# Patient Record
Sex: FEMALE | Race: ASIAN | NOT HISPANIC OR LATINO | Employment: OTHER | URBAN - METROPOLITAN AREA
[De-identification: names, ages, dates, MRNs, and addresses within clinical notes are randomized per-mention and may not be internally consistent; named-entity substitution may affect disease eponyms.]

---

## 2023-05-31 ENCOUNTER — TELEPHONE (OUTPATIENT)
Dept: PEDIATRICS | Facility: OTHER | Age: 73
End: 2023-05-31

## 2023-05-31 NOTE — TELEPHONE ENCOUNTER
Caller: YULIYA SPENCER    Relationship to patient: Emergency Contact    Best call back number: [phone]    Patient is needing: PATIENT'S RELATIVE CALLED AND SCHEDULED HER A NEW PATIENT APPOINTMENT WITH REESE PERALTA FOR July    THE PATIENT'S RELATIVE SAID THAT SHE HAS BEEN EXPERIENCING SOME DISCOMFORT IN HER CHEST AND WANTS TO BE SEEN FOR THAT    HUB ADVISED IF SHE IS EXPERIENCING CHEST PAIN SHE WILL NEED TO GO TO THE EMERGENCY ROOM, HE SAID HE DID NOT THINK IT WAS THAT BAD RIGHT NOW AND THAT IF IT BECAME WORSE HE WOULD TAKE HER TO THE EMERGENCY ROOM

## 2023-07-14 PROBLEM — Z12.11 SCREENING FOR MALIGNANT NEOPLASM OF COLON: Status: ACTIVE | Noted: 2023-07-14

## 2023-07-14 PROBLEM — Z00.00 PREVENTATIVE HEALTH CARE: Status: ACTIVE | Noted: 2023-07-14

## 2023-07-14 PROBLEM — Z13.820 SCREENING FOR OSTEOPOROSIS: Status: ACTIVE | Noted: 2023-07-14

## 2023-07-14 PROBLEM — E55.9 VITAMIN D DEFICIENCY: Status: ACTIVE | Noted: 2023-07-14

## 2023-07-14 PROBLEM — Z78.0 ASYMPTOMATIC MENOPAUSE: Status: ACTIVE | Noted: 2023-07-14

## 2023-07-14 PROBLEM — R07.9 CHEST PAIN: Status: ACTIVE | Noted: 2023-07-14

## 2023-07-14 PROBLEM — Z12.31 SCREENING MAMMOGRAM FOR BREAST CANCER: Status: ACTIVE | Noted: 2023-07-14

## 2025-01-27 ENCOUNTER — LAB (OUTPATIENT)
Dept: FAMILY MEDICINE CLINIC | Facility: CLINIC | Age: 75
End: 2025-01-27

## 2025-01-27 ENCOUNTER — OFFICE VISIT (OUTPATIENT)
Dept: FAMILY MEDICINE CLINIC | Facility: CLINIC | Age: 75
End: 2025-01-27

## 2025-01-27 VITALS
BODY MASS INDEX: 24.14 KG/M2 | SYSTOLIC BLOOD PRESSURE: 122 MMHG | HEIGHT: 58 IN | DIASTOLIC BLOOD PRESSURE: 80 MMHG | HEART RATE: 81 BPM | WEIGHT: 115 LBS | OXYGEN SATURATION: 98 %

## 2025-01-27 DIAGNOSIS — Z00.00 PREVENTATIVE HEALTH CARE: ICD-10-CM

## 2025-01-27 DIAGNOSIS — G89.29 CHRONIC PAIN OF RIGHT KNEE: Primary | ICD-10-CM

## 2025-01-27 DIAGNOSIS — E55.9 VITAMIN D DEFICIENCY: ICD-10-CM

## 2025-01-27 DIAGNOSIS — M25.561 CHRONIC PAIN OF RIGHT KNEE: Primary | ICD-10-CM

## 2025-01-27 PROCEDURE — 82306 VITAMIN D 25 HYDROXY: CPT | Performed by: NURSE PRACTITIONER

## 2025-01-27 PROCEDURE — 85027 COMPLETE CBC AUTOMATED: CPT | Performed by: NURSE PRACTITIONER

## 2025-01-27 PROCEDURE — 84443 ASSAY THYROID STIM HORMONE: CPT | Performed by: NURSE PRACTITIONER

## 2025-01-27 PROCEDURE — 80061 LIPID PANEL: CPT | Performed by: NURSE PRACTITIONER

## 2025-01-27 PROCEDURE — 36415 COLL VENOUS BLD VENIPUNCTURE: CPT | Performed by: NURSE PRACTITIONER

## 2025-01-27 PROCEDURE — 99214 OFFICE O/P EST MOD 30 MIN: CPT | Performed by: NURSE PRACTITIONER

## 2025-01-27 PROCEDURE — 80053 COMPREHEN METABOLIC PANEL: CPT | Performed by: NURSE PRACTITIONER

## 2025-01-27 PROCEDURE — 83036 HEMOGLOBIN GLYCOSYLATED A1C: CPT | Performed by: NURSE PRACTITIONER

## 2025-01-27 RX ORDER — MELOXICAM 7.5 MG/1
7.5 TABLET ORAL DAILY
Qty: 30 TABLET | Refills: 1 | Status: SHIPPED | OUTPATIENT
Start: 2025-01-27

## 2025-01-27 NOTE — PROGRESS NOTES
"Chief Complaint  Follow-up (Check up ) and Knee Pain (Right knee pain for the last few months/seems to be worse over the last month. )    Ester Romero presents to [facility]  History of Present Illness    Patient presents for follow-up visit.  She reports just needing a \"checkup.\"  Patient last seen in July 2023.  Past medical history includes hyperlipidemia and prediabetes.  Patient is not taking any medications currently. Patient is c/o right knee pain, ongoing for a few months. Son -in - law with her at visit today, denies any injury. Patient reports a clicking noise when ambulating. Patient denies dizziness, headache, chest pain, edema, cough or dyspnea. There are no GI or  complaints.     PHQ-9 Depression Screening  Little interest or pleasure in doing things? Not at all   Feeling down, depressed, or hopeless? Not at all   PHQ-2 Total Score 0   Trouble falling or staying asleep, or sleeping too much?     Feeling tired or having little energy?     Poor appetite or overeating?     Feeling bad about yourself - or that you are a failure or have let yourself or your family down?     Trouble concentrating on things, such as reading the newspaper or watching television?     Moving or speaking so slowly that other people could have noticed? Or the opposite - being so fidgety or restless that you have been moving around a lot more than usual?     Thoughts that you would be better off dead, or of hurting yourself in some way?     PHQ-9 Total Score     If you checked off any problems, how difficult have these problems made it for you to do your work, take care of things at home, or get along with other people? Not difficult at all      Objective   Vital Signs:  /80 (BP Location: Left arm, Patient Position: Sitting, Cuff Size: Adult)   Pulse 81   Ht 147.3 cm (58\")   Wt 52.2 kg (115 lb)   SpO2 98%   BMI 24.04 kg/m²   Estimated body mass index is 24.04 kg/m² as " "calculated from the following:    Height as of this encounter: 147.3 cm (58\").    Weight as of this encounter: 52.2 kg (115 lb).    BMI is within normal parameters. No other follow-up for BMI required.      Physical Exam  Constitutional:       Appearance: Normal appearance.   HENT:      Head: Normocephalic.   Cardiovascular:      Rate and Rhythm: Normal rate and regular rhythm.   Pulmonary:      Effort: Pulmonary effort is normal.      Breath sounds: Normal breath sounds.   Abdominal:      General: Abdomen is flat. Bowel sounds are normal.      Palpations: Abdomen is soft.   Musculoskeletal:         General: Normal range of motion.      Cervical back: Neck supple.      Right lower leg: No edema.      Left lower leg: No edema.   Skin:     General: Skin is warm and dry.   Neurological:      Mental Status: She is alert and oriented to person, place, and time.      Gait: Gait is intact.   Psychiatric:         Attention and Perception: Attention normal.         Mood and Affect: Mood normal.         Speech: Speech normal.        Result Review :                Assessment and Plan   Diagnoses and all orders for this visit:    1. Chronic pain of right knee (Primary)  -     meloxicam (Mobic) 7.5 MG tablet; Take 1 tablet by mouth Daily.  Dispense: 30 tablet; Refill: 1  -     XR Knee 3 View Right; Future    2. Preventative health care  Assessment & Plan:  Mammogram, Colon CA screening, DEXA deferred per patient  Labs today    Orders:  -     CBC (No Diff)  -     Comprehensive Metabolic Panel  -     Hemoglobin A1c  -     Lipid Panel  -     TSH    3. Vitamin D deficiency  -     Vitamin D,25-Hydroxy           I spent 30 minutes caring for Hadley on this date of service. This time includes time spent by me in the following activities:preparing for the visit, reviewing tests, obtaining and/or reviewing a separately obtained history, performing a medically appropriate examination and/or evaluation , counseling and educating the " patient/family/caregiver, ordering medications, tests, or procedures, documenting information in the medical record, and care coordination  Follow Up   Return in about 1 year (around 1/27/2026) for Annual physical.  Patient was given instructions and counseling regarding her condition or for health maintenance advice. Please see specific information pulled into the AVS if appropriate.

## 2025-01-28 LAB
25(OH)D3 SERPL-MCNC: 22 NG/ML (ref 30–100)
ALBUMIN SERPL-MCNC: 4.2 G/DL (ref 3.5–5.2)
ALBUMIN/GLOB SERPL: 1.2 G/DL
ALP SERPL-CCNC: 79 U/L (ref 39–117)
ALT SERPL W P-5'-P-CCNC: 15 U/L (ref 1–33)
ANION GAP SERPL CALCULATED.3IONS-SCNC: 10.8 MMOL/L (ref 5–15)
AST SERPL-CCNC: 12 U/L (ref 1–32)
BILIRUB SERPL-MCNC: 0.4 MG/DL (ref 0–1.2)
BUN SERPL-MCNC: 12 MG/DL (ref 8–23)
BUN/CREAT SERPL: 19.4 (ref 7–25)
CALCIUM SPEC-SCNC: 9.8 MG/DL (ref 8.6–10.5)
CHLORIDE SERPL-SCNC: 100 MMOL/L (ref 98–107)
CHOLEST SERPL-MCNC: 201 MG/DL (ref 0–200)
CO2 SERPL-SCNC: 29.2 MMOL/L (ref 22–29)
CREAT SERPL-MCNC: 0.62 MG/DL (ref 0.57–1)
DEPRECATED RDW RBC AUTO: 41.1 FL (ref 37–54)
EGFRCR SERPLBLD CKD-EPI 2021: 94.8 ML/MIN/1.73
ERYTHROCYTE [DISTWIDTH] IN BLOOD BY AUTOMATED COUNT: 12.9 % (ref 12.3–15.4)
GLOBULIN UR ELPH-MCNC: 3.5 GM/DL
GLUCOSE SERPL-MCNC: 135 MG/DL (ref 65–99)
HBA1C MFR BLD: 6.3 % (ref 4.8–5.6)
HCT VFR BLD AUTO: 40.6 % (ref 34–46.6)
HDLC SERPL-MCNC: 36 MG/DL (ref 40–60)
HGB BLD-MCNC: 13.6 G/DL (ref 12–15.9)
LDLC SERPL CALC-MCNC: 128 MG/DL (ref 0–100)
LDLC/HDLC SERPL: 3.42 {RATIO}
MCH RBC QN AUTO: 29.6 PG (ref 26.6–33)
MCHC RBC AUTO-ENTMCNC: 33.5 G/DL (ref 31.5–35.7)
MCV RBC AUTO: 88.3 FL (ref 79–97)
PLATELET # BLD AUTO: 376 10*3/MM3 (ref 140–450)
PMV BLD AUTO: 9.3 FL (ref 6–12)
POTASSIUM SERPL-SCNC: 4.4 MMOL/L (ref 3.5–5.2)
PROT SERPL-MCNC: 7.7 G/DL (ref 6–8.5)
RBC # BLD AUTO: 4.6 10*6/MM3 (ref 3.77–5.28)
SODIUM SERPL-SCNC: 140 MMOL/L (ref 136–145)
TRIGL SERPL-MCNC: 210 MG/DL (ref 0–150)
TSH SERPL DL<=0.05 MIU/L-ACNC: 0.77 UIU/ML (ref 0.27–4.2)
VLDLC SERPL-MCNC: 37 MG/DL (ref 5–40)
WBC NRBC COR # BLD AUTO: 6.62 10*3/MM3 (ref 3.4–10.8)

## 2025-01-28 RX ORDER — ATORVASTATIN CALCIUM 10 MG/1
10 TABLET, FILM COATED ORAL DAILY
Qty: 90 TABLET | Refills: 0 | Status: SHIPPED | OUTPATIENT
Start: 2025-01-28

## 2025-01-28 NOTE — PROGRESS NOTES
Please let patient know that her triglycerides have improved somewhat over the last year but her LDL is increasing.  My recommendation would be to start medication to help lower cholesterol and cardiovascular risk.  Vitamin D level remains very low.  Patient should be taking at least 1000 units of vitamin D3.  Hemoglobin A1c remains consistent with prediabetes.  Limiting concentrated sweets and carbohydrates would be recommended.    The 10-year ASCVD risk score (Celio YOUNG, et al., 2019) is: 11.2%    Values used to calculate the score:      Age: 74 years      Sex: Female      Is Non- : No      Diabetic: No      Tobacco smoker: No      Systolic Blood Pressure: 122 mmHg      Is BP treated: No      HDL Cholesterol: 36 mg/dL      Total Cholesterol: 201 mg/dL

## 2025-02-03 ENCOUNTER — HOSPITAL ENCOUNTER (OUTPATIENT)
Dept: GENERAL RADIOLOGY | Facility: HOSPITAL | Age: 75
Discharge: HOME | End: 2025-02-03
Admitting: NURSE PRACTITIONER

## 2025-02-03 DIAGNOSIS — G89.29 CHRONIC PAIN OF RIGHT KNEE: ICD-10-CM

## 2025-02-03 DIAGNOSIS — M25.561 CHRONIC PAIN OF RIGHT KNEE: ICD-10-CM

## 2025-02-03 PROCEDURE — 73562 X-RAY EXAM OF KNEE 3: CPT

## 2025-02-05 DIAGNOSIS — M25.561 CHRONIC PAIN OF RIGHT KNEE: Primary | ICD-10-CM

## 2025-02-05 DIAGNOSIS — M25.461 EFFUSION OF RIGHT KNEE: ICD-10-CM

## 2025-02-05 DIAGNOSIS — G89.29 CHRONIC PAIN OF RIGHT KNEE: Primary | ICD-10-CM

## 2025-02-05 NOTE — PROGRESS NOTES
Please let patient know that the knee x-ray shows mild to moderate degenerative changes with fluid to the right knee.  Recommendation would be a referral to Ortho to discuss aspirating the fluid off of the knee.  If agreeable, please let me know

## 2025-03-06 ENCOUNTER — TELEPHONE (OUTPATIENT)
Dept: ORTHOPEDIC SURGERY | Facility: CLINIC | Age: 75
End: 2025-03-06

## 2025-03-06 NOTE — TELEPHONE ENCOUNTER
This patient requires an  for their appointment. Please see the  information below.     Caller: YULIYA SPENCER  Relationship to patient: SON-IN-LAW  Best call back number: [phone] (home)    Service:VIDEO   Is  needs updated in registration: NO  Date of Appointment:3/31/25  Time of Appointment:9:30AM  Additional notes: NEEDS Thai  FOR UPCOMING VISIT

## 2025-04-07 ENCOUNTER — OFFICE VISIT (OUTPATIENT)
Dept: ORTHOPEDIC SURGERY | Facility: CLINIC | Age: 75
End: 2025-04-07

## 2025-04-07 VITALS — BODY MASS INDEX: 24.14 KG/M2 | HEIGHT: 58 IN | WEIGHT: 115 LBS | RESPIRATION RATE: 20 BRPM | OXYGEN SATURATION: 97 %

## 2025-04-07 DIAGNOSIS — M17.11 PRIMARY OSTEOARTHRITIS OF RIGHT KNEE: Primary | ICD-10-CM

## 2025-04-07 RX ORDER — TRIAMCINOLONE ACETONIDE 40 MG/ML
80 INJECTION, SUSPENSION INTRA-ARTICULAR; INTRAMUSCULAR
Status: COMPLETED | OUTPATIENT
Start: 2025-04-07 | End: 2025-04-07

## 2025-04-07 RX ORDER — LIDOCAINE HYDROCHLORIDE 10 MG/ML
4 INJECTION, SOLUTION EPIDURAL; INFILTRATION; INTRACAUDAL; PERINEURAL
Status: COMPLETED | OUTPATIENT
Start: 2025-04-07 | End: 2025-04-07

## 2025-04-07 RX ADMIN — LIDOCAINE HYDROCHLORIDE 4 ML: 10 INJECTION, SOLUTION EPIDURAL; INFILTRATION; INTRACAUDAL; PERINEURAL at 10:27

## 2025-04-07 RX ADMIN — TRIAMCINOLONE ACETONIDE 80 MG: 40 INJECTION, SUSPENSION INTRA-ARTICULAR; INTRAMUSCULAR at 10:27

## 2025-04-07 NOTE — PROGRESS NOTES
"Subjective:     Patient ID: Hadley Romero is a 74 y.o. female.    Chief Complaint:    Pain    Hadley Romero presents to clinic today for evaluation of right knee pain.  She states it hurts globally in the knee and mostly along the medial and lateral joint posterior aspect of the knee.  She saw her primary care doctor who ordered x-rays and referred her to me as well as gave her a prescription for meloxicam 7.5 mg p.o. daily.  The patient is hopeful to get some resolution of her symptoms and to avoid surgery.  She denies any specific injury.  She states that her left knee feels fine.     Social History     Occupational History   • Not on file   Tobacco Use   • Smoking status: Never     Passive exposure: Never   • Smokeless tobacco: Never   Vaping Use   • Vaping status: Never Used   Substance and Sexual Activity   • Alcohol use: Never   • Drug use: Never   • Sexual activity: Defer      History reviewed. No pertinent past medical history.  Past Surgical History:   Procedure Laterality Date   • CATARACT EXTRACTION Bilateral 01/2023       Family History   Problem Relation Age of Onset   • No Known Problems Mother    • No Known Problems Father                  Objective:  Vitals:    04/07/25 1001   Resp: 20   SpO2: 97%   Weight: 52.2 kg (115 lb)   Height: 147.3 cm (58\")         04/07/25  1001   Weight: 52.2 kg (115 lb)     Body mass index is 24.04 kg/m².  BMI is within normal parameters. No other follow-up for BMI required.        Right Knee Exam     Tenderness   The patient is experiencing tenderness in the medial retinaculum, medial joint line, patella, lateral joint line and lateral retinaculum (posterior).    Range of Motion   Extension:  5   Flexion:  110     Tests   Joshua:  Medial - positive Lateral - positive  Varus: negative Valgus: negative  Lachman:  Anterior - negative    Posterior - negative  Drawer:  Anterior - negative    Posterior - negative    Other   Erythema: absent  Scars: present  Sensation: normal  Pulse: " present  Swelling: mild  Effusion: no effusion present             Imaging: 3 views of the right knee were  reviewed by myself in the office today  Indication: right knee pain  Findings: X-rays demonstrate no acute osseous abnormality.  There is no signs of fracture dislocation or subluxation.  There is  severe  Kellgren and Gabriel grade 3joint space narrowing, subchondral sclerosis, cystic changes, and periarticular osteophytes most pronounced in the Medial joint.  Comparative studies: None    XR Knee 3 View Right  Result Date: 2/4/2025  Impression: 1.Mild to moderate tricompartmental degenerative changes. 2.Moderate right knee effusion. Electronically Signed: Donaldo Banks MD  2/4/2025 2:50 PM EST  Workstation ID: [id number]        Assessment:        1. Primary osteoarthritis of right knee           Plan:  Large Joint Arthrocentesis: R knee  Date/Time: 4/7/2025 10:27 AM  Consent given by: patient  Site marked: site marked  Timeout: Immediately prior to procedure a time out was called to verify the correct patient, procedure, equipment, support staff and site/side marked as required   Supporting Documentation  Indications: pain and joint swelling   Procedure Details  Location: knee - R knee  Needle size: 22 G  Approach: anterolateral  Medications administered: 4 mL lidocaine PF 1% 1 %; 80 mg triamcinolone acetonide 40 MG/ML  Patient tolerance: patient tolerated the procedure well with no immediate complications            Discussed treatment options at length with patient at today's visit.  I discussed with patient and her son-in-law who is interpreting for her today she has developed moderate to severe right knee osteoarthritis. I discussed with the patient that the mainstays of conservative treatment for knee OA include physical therapy, nonsteroidal anti-inflammatories, injections, activity modification, and home exercises.  The patient states that they  would like to try right knee intra-articular  corticosteroid injection.  She states that the meloxicam was not helping her so therefore I recommend she discontinue the use of it..  At this point time the patient does not need to schedule follow-up with me today.  I am happy to see the patient back at any point time however if issues arise or they fail to make a full recovery.  Follow up: As needed      Hadley Romero was in agreement with plan and had all questions answered.     Medications:  No orders of the defined types were placed in this encounter.      Followup:  No follow-ups on file.    Diagnoses and all orders for this visit:    1. Primary osteoarthritis of right knee (Primary)          Dictated utilizing Dragon dictation

## 2025-04-09 ENCOUNTER — PATIENT ROUNDING (BHMG ONLY) (OUTPATIENT)
Dept: ORTHOPEDIC SURGERY | Facility: CLINIC | Age: 75
End: 2025-04-09
Payer: MEDICAID